# Patient Record
(demographics unavailable — no encounter records)

---

## 2024-11-25 NOTE — ASSESSMENT
[FreeTextEntry1] : Patient comes in after injuring her right hand.  She had a fall in an awkward manner and injured her hand.  She thought it would get better and because it was not getting better she went to an urgent care facility where x-rays were done.  She has been using a thumb spica brace.  She says her pain has been improving but it still bothers her.  She is difficulty holding a pen.  She points to the volar aspect of the basal joint which bothers her.  She said it was a lot more swollen prior.  She has been taking anti-inflammatories consisting of either Advil or Aleve.  Right hand: No significant deformities visualized, nontender palpation over dorsal aspect of basal joint, tender palpation over volar aspect of basal joint by base of thenar eminence, full range of motion of fingers, negative provocative test, neurovascular intact  X-rays of the right hand 3 views done at outside facility are negative  Patient is a contusion of the hands.  She should continue with the thumb spica brace taking it off for gentle range of motion.  She will continue with the anti-inflammatories.  If she is not better in about a month she will return to see me.

## 2024-12-20 NOTE — ASSESSMENT
[FreeTextEntry1] : Patient comes in for follow-up of the injury of the right thumb.  She says her pain is doing a lot better.  She says when she first injured herself she had numbness and tingling in her thumb and not got better.  She recently noticed that she has decree sensation on the radial aspect of her thumb.  She went to the ophthalmologist yesterday and noticed she had difficulty taking off her glasses because of the numbness.  She did not notice this prior.  Her pain has improved significantly but has some mild discomfort at her basal joint.  R hand:  Slightly tender over CMC joint, no instability Full finger ROM  -Tinels  +Phalens  -Compression test  Decreased sensation radial aspect of thumb  Status post injury to the thumb and basal joint.  That injury is improving but now she has some decrease sensation in the area.  I believe she had a contusion to the median nerve. The patient was advised of the diagnosis.  The natural history of the pathology was explained in full to the patient in layman's terms. We discussed the nature of the nerve as an electrical cable and what happens to the nerve in carpal tunnel syndrome.  We discussed that treatment for night symptoms included night bracing.  We discussed the possibility of injection when symptoms were intermittent or in patients who were unwilling to undergo surgery with constant symptoms.  We discussed that injection is a diagnostic and therapeutic aide and what this means.  We discussed the use of nerve testing in cases when diagnosis was in doubt or for confirmation to exclude alternate pathology.  We discussed that if symptoms were 24/7 surgery was recommended to give the nerve the best chance to recover but that once symptoms were constant, the nerve may not recover even with surgery.  We discussed that if left alone the nerve progression could worsen and that treatment was indicated to prevent progression of nerve compression.  The longer the nerve is left, the more likely to cause worsening irreversible damage.  All questions were answered.  The risks and benefits of surgical and non-surgical treatment alternatives were explained in full to the patient. At this point in recommending the patient use a cock up her splint whenever she does anything strenuous or believes that her wrist will either be flexed or extended for a long period of time.  She will use it at nighttime to sleep.  In addition I recommended consideration of a Medrol Dosepak however the patient just found out she is pregnant.  She has an appoint with her OB next week and will ask the OB if she is able to take a Medrol Dosepak and if she cannot take a Medrol Dosepak we will consider an injection.  She will call us once she sees her OB to let us know.  I will see her back in 1 month.

## 2025-01-24 NOTE — ASSESSMENT
[FreeTextEntry1] : Patient comes in for follow-up of her right thumb pain numbness and tingling.  She says that she does not have pain anymore and the sensation is returning by her thenar eminence but from the MP joint down on the radial aspect of the thumb still has decreased sensation.  She does not have any more tingling or increased pain in the area but she still has a dull sensation in the area.  She does not have other complaints today.  Right hand: Nontender palpation anywhere along the hands, full range of motion of the thumb, negative Tinel's, normal sensation from the wrist crease to the MP joint and the radial aspect of the thumb decree sensation from the MP joint to the fingertip, more sensation from MP joint to DIP joint however distal to DIP joint significantly decreased sensation  Status post injury to the digital nerve of the thumb.  The patient is doing significantly better.  The sensation is returning from proximal to distal.  I believe this will just take time.  We will continue watchful waiting.  If it gets worse she will return.  Otherwise she will follow-up in about 2 months.  Should everything returned by that time she we will cancel her appointment.  Her sensation is improving from proximal to distal.